# Patient Record
Sex: FEMALE | Race: WHITE | NOT HISPANIC OR LATINO | URBAN - METROPOLITAN AREA
[De-identification: names, ages, dates, MRNs, and addresses within clinical notes are randomized per-mention and may not be internally consistent; named-entity substitution may affect disease eponyms.]

---

## 2022-09-01 ENCOUNTER — TELEMEDICINE (OUTPATIENT)
Dept: BEHAVIORAL/MENTAL HEALTH CLINIC | Facility: CLINIC | Age: 27
End: 2022-09-01
Payer: COMMERCIAL

## 2022-09-01 DIAGNOSIS — F41.9 ANXIETY DISORDER, UNSPECIFIED TYPE: ICD-10-CM

## 2022-09-01 DIAGNOSIS — F89 NEURODEVELOPMENTAL DISORDER: ICD-10-CM

## 2022-09-01 DIAGNOSIS — F32.A DEPRESSION, UNSPECIFIED DEPRESSION TYPE: Primary | ICD-10-CM

## 2022-09-01 PROCEDURE — 90834 PSYTX W PT 45 MINUTES: CPT

## 2022-09-01 NOTE — BH TREATMENT PLAN
Alvina Dentnawaf  1995       Date of Initial Treatment Plan: 6/7/22   Date of Current Treatment Plan: 09/01/22    Treatment Plan Number 1     Strengths/Personal Resources for Self Care: "creative and loyal", stable housing, employed     Diagnosis:   1  Depression, unspecified depression type     2  Anxiety disorder, unspecified type     3  Neurodevelopmental disorder         Area of Needs: emotional regulation, additional psychological evaluation       Long Term Goal 1: ACooperate and complete additional psychological evaluation    Target Date: 9/1/23  Completion Date: N/A         Short Term Objectives for Goal 1: Schedule evaluation with neuropsycholgist that is in network with health insurance plan    Long Term Goal 2: To improve coping by 80% to manage emotion dysregulation    Target Date: 9/1/23  Completion Date: N/A    Short Term Objectives for Goal 2: To learn 1-3 skills of emotional regulation         Long Term Goal # 3: N/A     Target Date: N/A  Completion Date: N/A    Short Term Objectives for Goal 3: N/A    GOAL 1: Modality: Individual 2x per month   Completion Date 10/1/23    GOAL 2: Modality: Individual 2x per month   Completion Date 10/1/23     GOAL 3: Modality: Medication Management      Behavioral Health Treatment Plan [facility]: Diagnosis and Treatment Plan explained to Edy Jeffery relates understanding diagnosis and is agreeable to Treatment Plan         Client Comments : Please share your thoughts, feelings, need and/or experiences regarding your treatment plan: Client is agreeable to plan and contributed to planning for treatment

## 2022-09-01 NOTE — PSYCH
Psychotherapy Provided: Individual Psychotherapy 45 minutes     Length of time in session: 45 minutes, follow up in 2  Week    Start time: 8:00 AM  End Time: 8:45 AM    Dx: Angela Cruz Depression, Unspec Anxiety, Neurological d/o, unspec    No diagnosis found  Goals addressed in session: Goal 1 and Goal 3      Pain:      none    0    Current suicide risk : Low     Video session via [employer]  Client and clinician discussed recent psychiatric evaluation  Client was given space to process impression diagnoses and clinician provided psycho-education  Clinician recommended and provided in network Neuropsychologist for client to contact for further evaluation  Behavioral Health Treatment Plan [facility]: Diagnosis and Treatment Plan explained to Traci Francois relates understanding diagnosis and is agreeable to Treatment Plan   Yes

## 2022-10-10 ENCOUNTER — DOCUMENTATION (OUTPATIENT)
Dept: BEHAVIORAL/MENTAL HEALTH CLINIC | Facility: CLINIC | Age: 27
End: 2022-10-10

## 2022-10-10 ENCOUNTER — TELEPHONE (OUTPATIENT)
Dept: BEHAVIORAL/MENTAL HEALTH CLINIC | Facility: CLINIC | Age: 27
End: 2022-10-10

## 2022-10-10 NOTE — TELEPHONE ENCOUNTER
Outreached to inform of resignation   No response and unable to leave message due to VM box being full

## 2022-11-09 ENCOUNTER — TELEPHONE (OUTPATIENT)
Dept: PSYCHIATRY | Facility: CLINIC | Age: 27
End: 2022-11-09

## 2022-11-09 NOTE — TELEPHONE ENCOUNTER
Called pt in regards to DEENA MADRIGAL from [facility]   KATY for pt to contact intake at [facility] for scheduling